# Patient Record
Sex: FEMALE | Race: WHITE | NOT HISPANIC OR LATINO | URBAN - METROPOLITAN AREA
[De-identification: names, ages, dates, MRNs, and addresses within clinical notes are randomized per-mention and may not be internally consistent; named-entity substitution may affect disease eponyms.]

---

## 2023-05-05 DIAGNOSIS — M54.50 ACUTE LOW BACK PAIN, UNSPECIFIED BACK PAIN LATERALITY, UNSPECIFIED WHETHER SCIATICA PRESENT: ICD-10-CM

## 2023-05-05 DIAGNOSIS — M54.50 LOW BACK PAIN, UNSPECIFIED: ICD-10-CM

## 2023-05-08 RX ORDER — TIZANIDINE HYDROCHLORIDE 4 MG/1
CAPSULE, GELATIN COATED ORAL
Qty: 90 CAPSULE | Refills: 1 | Status: SHIPPED | OUTPATIENT
Start: 2023-05-08 | End: 2023-11-28

## 2023-11-07 DIAGNOSIS — I10 ESSENTIAL (PRIMARY) HYPERTENSION: ICD-10-CM

## 2023-11-07 DIAGNOSIS — E78.5 HYPERLIPIDEMIA, UNSPECIFIED: ICD-10-CM

## 2023-11-08 RX ORDER — PRAVASTATIN SODIUM 40 MG/1
TABLET ORAL
Qty: 90 TABLET | Refills: 1 | Status: SHIPPED | OUTPATIENT
Start: 2023-11-08 | End: 2024-05-20

## 2023-11-08 RX ORDER — LOSARTAN POTASSIUM 25 MG/1
TABLET ORAL
Qty: 90 TABLET | Refills: 1 | Status: SHIPPED | OUTPATIENT
Start: 2023-11-08 | End: 2024-05-20

## 2023-11-26 DIAGNOSIS — M54.50 LOW BACK PAIN, UNSPECIFIED: ICD-10-CM

## 2023-11-28 RX ORDER — TIZANIDINE HYDROCHLORIDE 4 MG/1
CAPSULE, GELATIN COATED ORAL
Qty: 90 CAPSULE | Refills: 1 | Status: SHIPPED | OUTPATIENT
Start: 2023-11-28

## 2024-01-12 ENCOUNTER — TELEPHONE (OUTPATIENT)
Dept: PRIMARY CARE | Facility: CLINIC | Age: 64
End: 2024-01-12
Payer: COMMERCIAL

## 2024-01-12 DIAGNOSIS — Z87.09 PERSONAL HISTORY OF OTHER DISEASES OF THE RESPIRATORY SYSTEM: ICD-10-CM

## 2024-01-12 RX ORDER — ALBUTEROL SULFATE 90 UG/1
AEROSOL, METERED RESPIRATORY (INHALATION)
Qty: 18 G | Refills: 2 | Status: SHIPPED | OUTPATIENT
Start: 2024-01-12

## 2024-02-05 ENCOUNTER — OFFICE VISIT (OUTPATIENT)
Dept: PRIMARY CARE | Facility: CLINIC | Age: 64
End: 2024-02-05
Payer: COMMERCIAL

## 2024-02-05 VITALS
HEART RATE: 68 BPM | DIASTOLIC BLOOD PRESSURE: 71 MMHG | OXYGEN SATURATION: 95 % | SYSTOLIC BLOOD PRESSURE: 102 MMHG | BODY MASS INDEX: 20.89 KG/M2 | HEIGHT: 66 IN | WEIGHT: 130 LBS

## 2024-02-05 DIAGNOSIS — I10 PRIMARY HYPERTENSION: ICD-10-CM

## 2024-02-05 DIAGNOSIS — E78.5 HYPERLIPIDEMIA, UNSPECIFIED HYPERLIPIDEMIA TYPE: ICD-10-CM

## 2024-02-05 DIAGNOSIS — A49.9 BACTERIAL INFECTION: Primary | ICD-10-CM

## 2024-02-05 PROCEDURE — 3074F SYST BP LT 130 MM HG: CPT | Performed by: EMERGENCY MEDICINE

## 2024-02-05 PROCEDURE — 99213 OFFICE O/P EST LOW 20 MIN: CPT | Performed by: EMERGENCY MEDICINE

## 2024-02-05 PROCEDURE — 3078F DIAST BP <80 MM HG: CPT | Performed by: EMERGENCY MEDICINE

## 2024-02-05 RX ORDER — OMEPRAZOLE 40 MG/1
40 CAPSULE, DELAYED RELEASE ORAL DAILY
COMMUNITY

## 2024-02-05 RX ORDER — DOXYCYCLINE HYCLATE 100 MG
100 TABLET ORAL 2 TIMES DAILY
Qty: 14 TABLET | Refills: 0 | Status: SHIPPED | OUTPATIENT
Start: 2024-02-05 | End: 2024-02-12

## 2024-02-05 RX ORDER — NEOMYCIN SULFATE, POLYMYXIN B SULFATE, HYDROCORTISONE 3.5; 10000; 1 MG/ML; [USP'U]/ML; MG/ML
2 SOLUTION/ DROPS AURICULAR (OTIC) 4 TIMES DAILY
Qty: 10 ML | Refills: 0 | Status: SHIPPED | OUTPATIENT
Start: 2024-02-05 | End: 2024-02-12

## 2024-02-05 NOTE — PROGRESS NOTES
Subjective   Patient ID: Hanane Prescott is a 63 y.o. female who presents for Earache.    Assessment/Plan   Problem List Items Addressed This Visit       Hyperlipidemia    Hypertension     Other Visit Diagnoses       Bacterial infection    -  Primary    Relevant Medications    doxycycline (Vibra-Tabs) 100 mg tablet    neomycin-polymyxin-HC (Cortisporin) otic solution          Otitis media- doxycycline and antibiotic ear drops ordered     Uterine prolapse- Scheduled for hysterectomy 3/7/24     Hypertension- Continue Cozaar.     Hyperlipidemia- on pravastatin    GERD- prilosec     Preventive care  She had a colonoscopy in 2018-in future she can do Cologuard since there is no family history of colon cancer  Shingles and pneumonia shots up to date   Follows regularly with OB/GYN doctor for Pap smear    Follow up in 3 months for physical or sooner as needed     Source of history: Nurse, Medical personnel, Medical record, Patient.  History limitation: None.    HPI  63 y.o. female here for office visit    Presents with bilateral ear pain and itching onset yesterday. Reports mild rhinitis and sore throat.   Denies cough or fever.     No Known Allergies    Current Outpatient Medications   Medication Sig Dispense Refill    albuterol 90 mcg/actuation inhaler INHALE 1 TO 2 PUFFS BY MOUTH EVERY 4 TO 6 HOURS AS NEEDED 18 g 2    losartan (Cozaar) 25 mg tablet TAKE 1 TABLET BY MOUTH EVERY DAY 90 tablet 1    omeprazole (PriLOSEC) 40 mg DR capsule Take 1 capsule (40 mg) by mouth once daily.      pravastatin (Pravachol) 40 mg tablet TAKE 1 TABLET BY MOUTH EVERY DAY 90 tablet 1    tiZANidine (Zanaflex) 4 mg capsule TAKE 1 CAPSULE BY MOUTH EVERYDAY AT BEDTIME 90 capsule 1    doxycycline (Vibra-Tabs) 100 mg tablet Take 1 tablet (100 mg) by mouth 2 times a day for 7 days. Take with a full glass of water and do not lie down for at least 30 minutes after. 14 tablet 0    neomycin-polymyxin-HC (Cortisporin) otic solution Administer 2 drops  "into each ear 4 times a day for 7 days. 10 mL 0     No current facility-administered medications for this visit.       Objective   Visit Vitals  /71   Pulse 68   Ht 1.676 m (5' 6\")   Wt 59 kg (130 lb)   SpO2 95%   BMI 20.98 kg/m²   Smoking Status Former   BSA 1.66 m²     Physical Exam  Vital signs as per nursing/MA documentation   General appearance: Alert and in no acute distress  HEENT: Moderate swelling of TM bilaterally   Neck: Normal Inspection   Respiratory: No respiratory distress Lungs are clear   Cardiovascular: Heart rate normal. No gallop  Back: Normal Inspection   Skin inspection: Warm   Musculoskeletal: No deformities   Neuro: Limited exam. Baseline    Review of Systems  Comprehensive review of systems as allowed by patient condition and nursing input is negative    No visits with results within 4 Month(s) from this visit.   Latest known visit with results is:   Legacy Encounter on 03/04/2022   Component Date Value Ref Range Status    WBC 03/04/2022 5.8  4.4 - 11.3 x10E9/L Final    nRBC 03/04/2022 0.0  0.0 - 0.0 /100 WBC Final    RBC 03/04/2022 4.62  4.00 - 5.20 x10E12/L Final    Hemoglobin 03/04/2022 13.5  12.0 - 16.0 g/dL Final    Hematocrit 03/04/2022 43.7  36.0 - 46.0 % Final    MCV 03/04/2022 95  80 - 100 fL Final    MCHC 03/04/2022 30.9 (L)  32.0 - 36.0 g/dL Final    Platelets 03/04/2022 237  150 - 450 x10E9/L Final    RDW 03/04/2022 12.1  11.5 - 14.5 % Final    Neutrophils % 03/04/2022 51.4  40.0 - 80.0 % Final    Immature Granulocytes %, Automated 03/04/2022 0.2  0.0 - 0.9 % Final    Lymphocytes % 03/04/2022 35.4  13.0 - 44.0 % Final    Monocytes % 03/04/2022 9.2  2.0 - 10.0 % Final    Eosinophils % 03/04/2022 3.1  0.0 - 6.0 % Final    Basophils % 03/04/2022 0.7  0.0 - 2.0 % Final    Neutrophils Absolute 03/04/2022 3.00  1.20 - 7.70 x10E9/L Final    Lymphocytes Absolute 03/04/2022 2.07  1.20 - 4.80 x10E9/L Final    Monocytes Absolute 03/04/2022 0.54  0.10 - 1.00 x10E9/L Final    " Eosinophils Absolute 03/04/2022 0.18  0.00 - 0.70 x10E9/L Final    Basophils Absolute 03/04/2022 0.04  0.00 - 0.10 x10E9/L Final    Cholesterol 03/04/2022 206 (H)  0 - 199 mg/dL Final    HDL 03/04/2022 59.3  mg/dL Final    Cholesterol/HDL Ratio 03/04/2022 3.5   Final    LDL 03/04/2022 120 (H)  0 - 99 mg/dL Final    VLDL 03/04/2022 26  0 - 40 mg/dL Final    Triglycerides 03/04/2022 132  0 - 149 mg/dL Final    Hemoglobin A1C 03/04/2022 5.9 (A)  % Final    Estimated Average Glucose 03/04/2022 123  MG/DL Final    Glucose 03/04/2022 80  74 - 99 mg/dL Final    Sodium 03/04/2022 142  136 - 145 mmol/L Final    Potassium 03/04/2022 4.0  3.5 - 5.3 mmol/L Final    Chloride 03/04/2022 102  98 - 107 mmol/L Final    Bicarbonate 03/04/2022 29  21 - 32 mmol/L Final    Anion Gap 03/04/2022 15  10 - 20 mmol/L Final    Urea Nitrogen 03/04/2022 14  6 - 23 mg/dL Final    Creatinine 03/04/2022 0.85  0.50 - 1.05 mg/dL Final    GFR Female 03/04/2022 78  >90 mL/min/1.73m2 Final    Calcium 03/04/2022 9.3  8.6 - 10.6 mg/dL Final    Albumin 03/04/2022 4.4  3.4 - 5.0 g/dL Final    Alkaline Phosphatase 03/04/2022 101  33 - 136 U/L Final    Total Protein 03/04/2022 6.6  6.4 - 8.2 g/dL Final    AST 03/04/2022 23  9 - 39 U/L Final    Total Bilirubin 03/04/2022 0.9  0.0 - 1.2 mg/dL Final    ALT (SGPT) 03/04/2022 25  7 - 45 U/L Final    TSH 03/04/2022 3.14  0.44 - 3.98 mIU/L Final       Radiology: Reviewed imaging in powerchart.  No results found.    No family history on file.  Social History     Socioeconomic History    Marital status:      Spouse name: Not on file    Number of children: Not on file    Years of education: Not on file    Highest education level: Not on file   Occupational History    Not on file   Tobacco Use    Smoking status: Former     Types: Cigarettes    Smokeless tobacco: Never   Substance and Sexual Activity    Alcohol use: Not Currently    Drug use: Not on file    Sexual activity: Not on file   Other Topics Concern     Not on file   Social History Narrative    Not on file     Social Determinants of Health     Financial Resource Strain: Not on file   Food Insecurity: Not on file   Transportation Needs: Not on file   Physical Activity: Not on file   Stress: Not on file   Social Connections: Not on file   Intimate Partner Violence: Not on file   Housing Stability: Not on file     Past Medical History:   Diagnosis Date    Essential (primary) hypertension 02/01/2018    Benign essential hypertension    Personal history of other diseases of the musculoskeletal system and connective tissue     Personal history of arthritis    Personal history of other diseases of the respiratory system 11/19/2017    History of chronic obstructive lung disease    Personal history of other specified conditions 08/31/2018    History of abnormal weight loss    Unspecified sensorineural hearing loss 08/31/2018    Hearing loss, central     Past Surgical History:   Procedure Laterality Date    OTHER SURGICAL HISTORY  02/27/2014    Wrist Surgery    OTHER SURGICAL HISTORY  02/27/2014    Ear Surgery       Scribe Attestation  By signing my name below, IAlethea Scribe   attest that this documentation has been prepared under the direction and in the presence of Paul Gomez MD.

## 2024-02-06 ENCOUNTER — TELEPHONE (OUTPATIENT)
Dept: PRIMARY CARE | Facility: CLINIC | Age: 64
End: 2024-02-06
Payer: COMMERCIAL

## 2024-02-06 NOTE — TELEPHONE ENCOUNTER
PT was in yesterday and prescribed doxycycline (Vibra-Tabs) 100 mg tablet.  She said she thinks she is having a reaction because her upper left lip is swollen.  She took a benadryl at 3:30 AM and that didn't help.  The medication also made her feel very nauseated.  She can breathe and nothing hurts, just the swelling of her lip area.

## 2024-05-19 DIAGNOSIS — I10 ESSENTIAL (PRIMARY) HYPERTENSION: ICD-10-CM

## 2024-05-19 DIAGNOSIS — E78.5 HYPERLIPIDEMIA, UNSPECIFIED: ICD-10-CM

## 2024-05-20 RX ORDER — PRAVASTATIN SODIUM 40 MG/1
TABLET ORAL
Qty: 90 TABLET | Refills: 1 | Status: SHIPPED | OUTPATIENT
Start: 2024-05-20

## 2024-05-20 RX ORDER — LOSARTAN POTASSIUM 25 MG/1
TABLET ORAL
Qty: 90 TABLET | Refills: 1 | Status: SHIPPED | OUTPATIENT
Start: 2024-05-20

## 2024-08-30 ENCOUNTER — TELEPHONE (OUTPATIENT)
Dept: PRIMARY CARE | Facility: CLINIC | Age: 64
End: 2024-08-30
Payer: COMMERCIAL

## 2024-08-30 NOTE — TELEPHONE ENCOUNTER
Pt called stating she was called in for jury duty, but she is her dads caregiver so she is requesting a letter excusing her. Father is a gumejosé miguel pt: bryon aguillon 7/[birth year]

## 2024-09-11 ENCOUNTER — TELEMEDICINE (OUTPATIENT)
Dept: PRIMARY CARE | Facility: CLINIC | Age: 64
End: 2024-09-11
Payer: COMMERCIAL

## 2024-09-11 VITALS — WEIGHT: 123 LBS | BODY MASS INDEX: 19.77 KG/M2 | HEIGHT: 66 IN

## 2024-09-11 DIAGNOSIS — A49.9 BACTERIAL INFECTION: Primary | ICD-10-CM

## 2024-09-11 DIAGNOSIS — I10 PRIMARY HYPERTENSION: ICD-10-CM

## 2024-09-11 DIAGNOSIS — R05.9 COUGH, UNSPECIFIED TYPE: ICD-10-CM

## 2024-09-11 DIAGNOSIS — E78.5 HYPERLIPIDEMIA, UNSPECIFIED HYPERLIPIDEMIA TYPE: ICD-10-CM

## 2024-09-11 PROCEDURE — 3008F BODY MASS INDEX DOCD: CPT | Performed by: EMERGENCY MEDICINE

## 2024-09-11 PROCEDURE — 1036F TOBACCO NON-USER: CPT | Performed by: EMERGENCY MEDICINE

## 2024-09-11 PROCEDURE — 99213 OFFICE O/P EST LOW 20 MIN: CPT | Performed by: EMERGENCY MEDICINE

## 2024-09-11 RX ORDER — AZITHROMYCIN 250 MG/1
250 TABLET, FILM COATED ORAL DAILY
Qty: 6 TABLET | Refills: 0 | Status: SHIPPED | OUTPATIENT
Start: 2024-09-11 | End: 2024-09-16

## 2024-09-11 ASSESSMENT — PATIENT HEALTH QUESTIONNAIRE - PHQ9
SUM OF ALL RESPONSES TO PHQ9 QUESTIONS 1 AND 2: 0
2. FEELING DOWN, DEPRESSED OR HOPELESS: NOT AT ALL
1. LITTLE INTEREST OR PLEASURE IN DOING THINGS: NOT AT ALL

## 2024-09-11 ASSESSMENT — ENCOUNTER SYMPTOMS: SORE THROAT: 1

## 2024-09-11 NOTE — PROGRESS NOTES
Subjective   Patient ID: Hanane Prescott is a 64 y.o. female who presents for virtual follow up visit    Assessment/Plan   Problem List Items Addressed This Visit    None    Upper respiratory symptoms - Patient complains of sore throat and ear aches in both ears along with runny nose. We will prescribe Z-pac and continue to monitor.    Uterine prolapse- Had a hysterectomy 3/7/24     Hypertension- Continue Cozaar.     Hyperlipidemia- on pravastatin    GERD- prilosec     Preventive care  She had a colonoscopy in 2018-in future she can do Cologuard since there is no family history of colon cancer  Shingles and pneumonia shots up to date   Follows regularly with OB/GYN doctor for Pap smear    Follow up in 3 months for physical or sooner as needed     Source of history: Nurse, Medical personnel, Medical record, Patient.  History limitation: None.    Sore Throat     Ear Pain       64 y.o. female here for virtual office visit    Reports mild rhinitis and sore throat and bilateral ear infections.    No Known Allergies    Current Outpatient Medications   Medication Sig Dispense Refill    albuterol 90 mcg/actuation inhaler INHALE 1 TO 2 PUFFS BY MOUTH EVERY 4 TO 6 HOURS AS NEEDED 18 g 2    losartan (Cozaar) 25 mg tablet TAKE 1 TABLET BY MOUTH EVERY DAY 90 tablet 1    omeprazole (PriLOSEC) 40 mg DR capsule Take 1 capsule (40 mg) by mouth once daily.      pravastatin (Pravachol) 40 mg tablet TAKE 1 TABLET BY MOUTH EVERY DAY 90 tablet 1    tiZANidine (Zanaflex) 4 mg capsule TAKE 1 CAPSULE BY MOUTH EVERYDAY AT BEDTIME 90 capsule 1     No current facility-administered medications for this visit.       Objective       Physical Exam  Patient was not physically examined as this visit was completed virtually.     Review of Systems  Comprehensive review of systems as allowed by patient condition and nursing input is negative    No visits with results within 4 Month(s) from this visit.   Latest known visit with results is:   Ra  Encounter on 03/04/2022   Component Date Value Ref Range Status    WBC 03/04/2022 5.8  4.4 - 11.3 x10E9/L Final    nRBC 03/04/2022 0.0  0.0 - 0.0 /100 WBC Final    RBC 03/04/2022 4.62  4.00 - 5.20 x10E12/L Final    Hemoglobin 03/04/2022 13.5  12.0 - 16.0 g/dL Final    Hematocrit 03/04/2022 43.7  36.0 - 46.0 % Final    MCV 03/04/2022 95  80 - 100 fL Final    MCHC 03/04/2022 30.9 (L)  32.0 - 36.0 g/dL Final    Platelets 03/04/2022 237  150 - 450 x10E9/L Final    RDW 03/04/2022 12.1  11.5 - 14.5 % Final    Neutrophils % 03/04/2022 51.4  40.0 - 80.0 % Final    Immature Granulocytes %, Automated 03/04/2022 0.2  0.0 - 0.9 % Final    Lymphocytes % 03/04/2022 35.4  13.0 - 44.0 % Final    Monocytes % 03/04/2022 9.2  2.0 - 10.0 % Final    Eosinophils % 03/04/2022 3.1  0.0 - 6.0 % Final    Basophils % 03/04/2022 0.7  0.0 - 2.0 % Final    Neutrophils Absolute 03/04/2022 3.00  1.20 - 7.70 x10E9/L Final    Lymphocytes Absolute 03/04/2022 2.07  1.20 - 4.80 x10E9/L Final    Monocytes Absolute 03/04/2022 0.54  0.10 - 1.00 x10E9/L Final    Eosinophils Absolute 03/04/2022 0.18  0.00 - 0.70 x10E9/L Final    Basophils Absolute 03/04/2022 0.04  0.00 - 0.10 x10E9/L Final    Cholesterol 03/04/2022 206 (H)  0 - 199 mg/dL Final    HDL 03/04/2022 59.3  mg/dL Final    Cholesterol/HDL Ratio 03/04/2022 3.5   Final    LDL 03/04/2022 120 (H)  0 - 99 mg/dL Final    VLDL 03/04/2022 26  0 - 40 mg/dL Final    Triglycerides 03/04/2022 132  0 - 149 mg/dL Final    Hemoglobin A1C 03/04/2022 5.9 (A)  % Final    Estimated Average Glucose 03/04/2022 123  MG/DL Final    Glucose 03/04/2022 80  74 - 99 mg/dL Final    Sodium 03/04/2022 142  136 - 145 mmol/L Final    Potassium 03/04/2022 4.0  3.5 - 5.3 mmol/L Final    Chloride 03/04/2022 102  98 - 107 mmol/L Final    Bicarbonate 03/04/2022 29  21 - 32 mmol/L Final    Anion Gap 03/04/2022 15  10 - 20 mmol/L Final    Urea Nitrogen 03/04/2022 14  6 - 23 mg/dL Final    Creatinine 03/04/2022 0.85  0.50 - 1.05 mg/dL  Final    GFR Female 03/04/2022 78  >90 mL/min/1.73m2 Final    Calcium 03/04/2022 9.3  8.6 - 10.6 mg/dL Final    Albumin 03/04/2022 4.4  3.4 - 5.0 g/dL Final    Alkaline Phosphatase 03/04/2022 101  33 - 136 U/L Final    Total Protein 03/04/2022 6.6  6.4 - 8.2 g/dL Final    AST 03/04/2022 23  9 - 39 U/L Final    Total Bilirubin 03/04/2022 0.9  0.0 - 1.2 mg/dL Final    ALT (SGPT) 03/04/2022 25  7 - 45 U/L Final    TSH 03/04/2022 3.14  0.44 - 3.98 mIU/L Final       Radiology: Reviewed imaging in powerchart.  No results found.    No family history on file.  Social History     Socioeconomic History    Marital status:    Tobacco Use    Smoking status: Former     Types: Cigarettes    Smokeless tobacco: Never   Substance and Sexual Activity    Alcohol use: Not Currently     Past Medical History:   Diagnosis Date    Essential (primary) hypertension 02/01/2018    Benign essential hypertension    Personal history of other diseases of the musculoskeletal system and connective tissue     Personal history of arthritis    Personal history of other diseases of the respiratory system 11/19/2017    History of chronic obstructive lung disease    Personal history of other specified conditions 08/31/2018    History of abnormal weight loss    Unspecified sensorineural hearing loss 08/31/2018    Hearing loss, central     Past Surgical History:   Procedure Laterality Date    OTHER SURGICAL HISTORY  02/27/2014    Wrist Surgery    OTHER SURGICAL HISTORY  02/27/2014    Ear Surgery

## 2024-09-17 DIAGNOSIS — Z87.09 PERSONAL HISTORY OF OTHER DISEASES OF THE RESPIRATORY SYSTEM: ICD-10-CM

## 2024-09-19 RX ORDER — ALBUTEROL SULFATE 90 UG/1
INHALANT RESPIRATORY (INHALATION)
Qty: 18 G | Refills: 2 | Status: SHIPPED | OUTPATIENT
Start: 2024-09-19

## 2024-11-16 DIAGNOSIS — E78.5 HYPERLIPIDEMIA, UNSPECIFIED: ICD-10-CM

## 2024-11-16 DIAGNOSIS — I10 ESSENTIAL (PRIMARY) HYPERTENSION: ICD-10-CM

## 2024-11-19 RX ORDER — PRAVASTATIN SODIUM 40 MG/1
TABLET ORAL
Qty: 90 TABLET | Refills: 1 | Status: SHIPPED | OUTPATIENT
Start: 2024-11-19

## 2024-11-19 RX ORDER — LOSARTAN POTASSIUM 25 MG/1
TABLET ORAL
Qty: 90 TABLET | Refills: 1 | Status: SHIPPED | OUTPATIENT
Start: 2024-11-19

## 2025-04-03 ENCOUNTER — TELEMEDICINE (OUTPATIENT)
Dept: PRIMARY CARE | Facility: CLINIC | Age: 65
End: 2025-04-03
Payer: COMMERCIAL

## 2025-04-03 VITALS — WEIGHT: 124 LBS | HEIGHT: 66 IN | BODY MASS INDEX: 19.93 KG/M2

## 2025-04-03 DIAGNOSIS — E78.2 HYPERLIPEMIA, MIXED: ICD-10-CM

## 2025-04-03 DIAGNOSIS — R05.9 COUGH, UNSPECIFIED TYPE: ICD-10-CM

## 2025-04-03 DIAGNOSIS — J20.8 ACUTE BACTERIAL BRONCHITIS: Primary | ICD-10-CM

## 2025-04-03 DIAGNOSIS — I10 BENIGN ESSENTIAL HYPERTENSION: ICD-10-CM

## 2025-04-03 DIAGNOSIS — B96.89 ACUTE BACTERIAL BRONCHITIS: Primary | ICD-10-CM

## 2025-04-03 PROCEDURE — 1036F TOBACCO NON-USER: CPT | Performed by: INTERNAL MEDICINE

## 2025-04-03 PROCEDURE — 3008F BODY MASS INDEX DOCD: CPT | Performed by: INTERNAL MEDICINE

## 2025-04-03 PROCEDURE — 99213 OFFICE O/P EST LOW 20 MIN: CPT | Performed by: INTERNAL MEDICINE

## 2025-04-03 RX ORDER — AZITHROMYCIN 250 MG/1
TABLET, FILM COATED ORAL
Qty: 6 TABLET | Refills: 0 | Status: SHIPPED | OUTPATIENT
Start: 2025-04-03 | End: 2025-04-08

## 2025-04-03 RX ORDER — METHYLPREDNISOLONE 4 MG/1
TABLET ORAL
Qty: 21 TABLET | Refills: 0 | Status: SHIPPED | OUTPATIENT
Start: 2025-04-03 | End: 2025-04-09

## 2025-04-03 NOTE — PROGRESS NOTES
Subjective   Patient ID: Hanane Prescott is a 64 y.o. female who presents for Virtual Visit (Yellow mucus /Trouble swallowing ), Sore Throat, and Earache (Bilateral ).    Assessment/Plan     Problem List Items Addressed This Visit    None  Visit Diagnoses       Cough, unspecified type    -  Primary    Relevant Medications    azithromycin (Zithromax) 250 mg tablet    methylPREDNISolone (Medrol Dospak) 4 mg tablets    Other Relevant Orders    Comprehensive Metabolic Panel    Lipid Panel    XR chest 2 views    Influenza A, and B PCR    RSV PCR    Sars-CoV-2 PCR    Acute bacterial bronchitis        Relevant Medications    azithromycin (Zithromax) 250 mg tablet    methylPREDNISolone (Medrol Dospak) 4 mg tablets    Other Relevant Orders    Comprehensive Metabolic Panel    Lipid Panel    Influenza A, and B PCR    RSV PCR    Sars-CoV-2 PCR          Patient was evaluated today, problem list was reviewed, problems and concerns addressed, Rx list reviewed and updated, lab and tests were noted and reviewed. Life style changes were discussed, always it works better if we eat plant based diet and plenty of fibres and roughage. Consume adequate amount of water and avoid alcohol, light to moderate physical activities and stress reduction are always beneficial for ongoing physical well being. Do not forget to have 6 to 7 hours of sleep regularly and avoid late night jocelyn screen exposure.    HPIHanane Prescott is a 64 y.o. female who presents for Virtual Visit (Yellow mucus /Trouble swallowing ), Sore Throat, and Earache (Bilateral ).  69-year-old patient have a gastritis hypertension hyperlipidemia and arteries cough congestions ear pain sore throat shortness of breath onset acutely duration 3 days progressive acutely aggravating factor postviral bacterial infections activity working    Negative for hypoxia hypoglycemia fall    Negative for loss of taste or smell    Negative for DVT or PE    Clinical impression acute postviral  bacterial bronchitis given Z-Keith Medrol Dosepak probiotic Shen for the blood test viral panel chest x-ray if not better    Patient will continue albuterol Cozaar Prilosec Pravachol and follow-up with PCP if not better    Assessment  Past Medical History:   Diagnosis Date    Essential (primary) hypertension 02/01/2018    Benign essential hypertension    Personal history of other diseases of the musculoskeletal system and connective tissue     Personal history of arthritis    Personal history of other diseases of the respiratory system 11/19/2017    History of chronic obstructive lung disease    Personal history of other specified conditions 08/31/2018    History of abnormal weight loss    Unspecified sensorineural hearing loss 08/31/2018    Hearing loss, central     Past Surgical History:   Procedure Laterality Date    OTHER SURGICAL HISTORY  02/27/2014    Wrist Surgery    OTHER SURGICAL HISTORY  02/27/2014    Ear Surgery     Allergies   Allergen Reactions    Doxycycline Hyclate Unknown     Current Outpatient Medications   Medication Sig Dispense Refill    albuterol 90 mcg/actuation inhaler INHALE 1 TO 2 PUFFS BY MOUTH EVERY 4 TO 6 HOURS AS NEEDED 18 g 2    azithromycin (Zithromax) 250 mg tablet Take 2 tablets (500 mg) by mouth once daily for 1 day, THEN 1 tablet (250 mg) once daily for 4 days. Take 2 tabs (500 mg) by mouth today, than 1 daily for 4 days.. 6 tablet 0    losartan (Cozaar) 25 mg tablet TAKE 1 TABLET BY MOUTH EVERY DAY 90 tablet 1    methylPREDNISolone (Medrol Dospak) 4 mg tablets Take as directed on package. 21 tablet 0    omeprazole (PriLOSEC) 40 mg DR capsule Take 1 capsule (40 mg) by mouth once daily.      pravastatin (Pravachol) 40 mg tablet TAKE 1 TABLET BY MOUTH EVERY DAY 90 tablet 1    tiZANidine (Zanaflex) 4 mg capsule TAKE 1 CAPSULE BY MOUTH EVERYDAY AT BEDTIME 90 capsule 1     No current facility-administered medications for this visit.     No family history on file.  Social History  "    Socioeconomic History    Marital status:    Tobacco Use    Smoking status: Former     Types: Cigarettes    Smokeless tobacco: Never   Substance and Sexual Activity    Alcohol use: Not Currently     Immunization History   Administered Date(s) Administered    Moderna SARS-CoV-2 Vaccination 03/18/2021, 04/15/2021, 12/03/2021       Review of Systems  Review of systems is otherwise negative unless stated above or in history of present illness.    Objective   Visit Vitals  Ht 1.676 m (5' 6\")   Wt 56.2 kg (124 lb)   BMI 20.01 kg/m²   Smoking Status Former   BSA 1.62 m²     Physical Exam  Patient feels and looks little sick not in distress no skin rash no hypoxia.Doxy.Doxy  This visit was completed via video audio relation to  covid 19 pandemic all issues as below that discuss and address but no physical exam was performed if it was felt that patient should be evaluated in clinic and they have been advised to follow . Patient verbally consented to visit and spent  more than 50% discuss about patient's complaint of problem and plan        No visits with results within 4 Month(s) from this visit.   Latest known visit with results is:   Legacy Encounter on 03/04/2022   Component Date Value Ref Range Status    WBC 03/04/2022 5.8  4.4 - 11.3 x10E9/L Final    nRBC 03/04/2022 0.0  0.0 - 0.0 /100 WBC Final    RBC 03/04/2022 4.62  4.00 - 5.20 x10E12/L Final    Hemoglobin 03/04/2022 13.5  12.0 - 16.0 g/dL Final    Hematocrit 03/04/2022 43.7  36.0 - 46.0 % Final    MCV 03/04/2022 95  80 - 100 fL Final    MCHC 03/04/2022 30.9 (L)  32.0 - 36.0 g/dL Final    Platelets 03/04/2022 237  150 - 450 x10E9/L Final    RDW 03/04/2022 12.1  11.5 - 14.5 % Final    Neutrophils % 03/04/2022 51.4  40.0 - 80.0 % Final    Immature Granulocytes %, Automated 03/04/2022 0.2  0.0 - 0.9 % Final    Lymphocytes % 03/04/2022 35.4  13.0 - 44.0 % Final    Monocytes % 03/04/2022 9.2  2.0 - 10.0 % Final    Eosinophils % 03/04/2022 3.1  0.0 - 6.0 % Final "    Basophils % 03/04/2022 0.7  0.0 - 2.0 % Final    Neutrophils Absolute 03/04/2022 3.00  1.20 - 7.70 x10E9/L Final    Lymphocytes Absolute 03/04/2022 2.07  1.20 - 4.80 x10E9/L Final    Monocytes Absolute 03/04/2022 0.54  0.10 - 1.00 x10E9/L Final    Eosinophils Absolute 03/04/2022 0.18  0.00 - 0.70 x10E9/L Final    Basophils Absolute 03/04/2022 0.04  0.00 - 0.10 x10E9/L Final    Cholesterol 03/04/2022 206 (H)  0 - 199 mg/dL Final    HDL 03/04/2022 59.3  mg/dL Final    Cholesterol/HDL Ratio 03/04/2022 3.5   Final    LDL 03/04/2022 120 (H)  0 - 99 mg/dL Final    VLDL 03/04/2022 26  0 - 40 mg/dL Final    Triglycerides 03/04/2022 132  0 - 149 mg/dL Final    Hemoglobin A1C 03/04/2022 5.9 (A)  % Final    Estimated Average Glucose 03/04/2022 123  MG/DL Final    Glucose 03/04/2022 80  74 - 99 mg/dL Final    Sodium 03/04/2022 142  136 - 145 mmol/L Final    Potassium 03/04/2022 4.0  3.5 - 5.3 mmol/L Final    Chloride 03/04/2022 102  98 - 107 mmol/L Final    Bicarbonate 03/04/2022 29  21 - 32 mmol/L Final    Anion Gap 03/04/2022 15  10 - 20 mmol/L Final    Urea Nitrogen 03/04/2022 14  6 - 23 mg/dL Final    Creatinine 03/04/2022 0.85  0.50 - 1.05 mg/dL Final    GFR Female 03/04/2022 78  >90 mL/min/1.73m2 Final    Calcium 03/04/2022 9.3  8.6 - 10.6 mg/dL Final    Albumin 03/04/2022 4.4  3.4 - 5.0 g/dL Final    Alkaline Phosphatase 03/04/2022 101  33 - 136 U/L Final    Total Protein 03/04/2022 6.6  6.4 - 8.2 g/dL Final    AST 03/04/2022 23  9 - 39 U/L Final    Total Bilirubin 03/04/2022 0.9  0.0 - 1.2 mg/dL Final    ALT (SGPT) 03/04/2022 25  7 - 45 U/L Final    TSH 03/04/2022 3.14  0.44 - 3.98 mIU/L Final       Radiology: Reviewed imaging in powerchart.  Imaging  No results found.    Cardiology, Vascular, and Other Imaging  No other imaging results found for the past 7 days        Charting was completed using voice recognition technology and may include unintended errors.

## 2025-05-17 DIAGNOSIS — E78.5 HYPERLIPIDEMIA, UNSPECIFIED: ICD-10-CM

## 2025-05-17 DIAGNOSIS — I10 ESSENTIAL (PRIMARY) HYPERTENSION: ICD-10-CM

## 2025-05-21 RX ORDER — PRAVASTATIN SODIUM 40 MG/1
40 TABLET ORAL DAILY
Qty: 90 TABLET | Refills: 0 | Status: SHIPPED | OUTPATIENT
Start: 2025-05-21

## 2025-05-21 RX ORDER — LOSARTAN POTASSIUM 25 MG/1
25 TABLET ORAL DAILY
Qty: 90 TABLET | Refills: 0 | Status: SHIPPED | OUTPATIENT
Start: 2025-05-21

## 2025-05-23 ENCOUNTER — OFFICE VISIT (OUTPATIENT)
Dept: PRIMARY CARE | Facility: CLINIC | Age: 65
End: 2025-05-23
Payer: COMMERCIAL

## 2025-05-23 VITALS
OXYGEN SATURATION: 96 % | HEIGHT: 66 IN | WEIGHT: 128.2 LBS | HEART RATE: 57 BPM | SYSTOLIC BLOOD PRESSURE: 120 MMHG | DIASTOLIC BLOOD PRESSURE: 80 MMHG | BODY MASS INDEX: 20.6 KG/M2

## 2025-05-23 DIAGNOSIS — E78.2 HYPERLIPEMIA, MIXED: ICD-10-CM

## 2025-05-23 DIAGNOSIS — J43.9 PULMONARY EMPHYSEMA, UNSPECIFIED EMPHYSEMA TYPE (MULTI): ICD-10-CM

## 2025-05-23 DIAGNOSIS — J44.9 CHRONIC OBSTRUCTIVE PULMONARY DISEASE, UNSPECIFIED COPD TYPE (MULTI): ICD-10-CM

## 2025-05-23 DIAGNOSIS — R05.9 COUGH, UNSPECIFIED TYPE: ICD-10-CM

## 2025-05-23 DIAGNOSIS — I10 BENIGN ESSENTIAL HYPERTENSION: ICD-10-CM

## 2025-05-23 DIAGNOSIS — Z00.00 WELLNESS EXAMINATION: Primary | ICD-10-CM

## 2025-05-23 LAB
NON-UH HIE A/G RATIO: 1.5
NON-UH HIE ALB: 4.2 G/DL (ref 3.4–5)
NON-UH HIE ALK PHOS: 104 UNIT/L (ref 45–117)
NON-UH HIE BASO COUNT: 0.03 X1000 (ref 0–0.2)
NON-UH HIE BASOS %: 0.5 %
NON-UH HIE BILIRUBIN, TOTAL: 1 MG/DL (ref 0.3–1.2)
NON-UH HIE BUN/CREAT RATIO: 13.3
NON-UH HIE BUN: 12 MG/DL (ref 9–23)
NON-UH HIE CALCIUM: 9.6 MG/DL (ref 8.7–10.4)
NON-UH HIE CALCULATED LDL CHOLESTEROL: 109 MG/DL (ref 60–130)
NON-UH HIE CALCULATED OSMOLALITY: 283 MOSM/KG (ref 275–295)
NON-UH HIE CHLORIDE: 103 MMOL/L (ref 98–107)
NON-UH HIE CHOLESTEROL: 195 MG/DL (ref 100–200)
NON-UH HIE CO2, VENOUS: 33 MMOL/L (ref 20–31)
NON-UH HIE CREATININE: 0.9 MG/DL (ref 0.5–0.8)
NON-UH HIE DIFF?: NORMAL
NON-UH HIE EOS COUNT: 0.11 X1000 (ref 0–0.5)
NON-UH HIE EOSIN %: 1.9 %
NON-UH HIE GFR AA: >60
NON-UH HIE GLOBULIN: 2.8 G/DL
NON-UH HIE GLOMERULAR FILTRATION RATE: >60 ML/MIN/1.73M?
NON-UH HIE GLUCOSE: 91 MG/DL (ref 74–106)
NON-UH HIE GOT: 28 UNIT/L (ref 15–37)
NON-UH HIE GPT: 26 UNIT/L (ref 10–49)
NON-UH HIE HCT: 40.7 % (ref 36–46)
NON-UH HIE HDL CHOLESTEROL: 64 MG/DL (ref 40–60)
NON-UH HIE HGB A1C: 5.5 %
NON-UH HIE HGB: 13.6 G/DL (ref 12–16)
NON-UH HIE INSTR WBC: 5.6
NON-UH HIE K: 4 MMOL/L (ref 3.5–5.1)
NON-UH HIE LYMPH %: 35.4 %
NON-UH HIE LYMPH COUNT: 1.99 X1000 (ref 1.2–4.8)
NON-UH HIE MCH: 29.7 PG (ref 27–34)
NON-UH HIE MCHC: 33.4 G/DL (ref 32–37)
NON-UH HIE MCV: 89.2 FL (ref 80–100)
NON-UH HIE MONO %: 8.7 %
NON-UH HIE MONO COUNT: 0.49 X1000 (ref 0.1–1)
NON-UH HIE MPV: 8.9 FL (ref 7.4–10.4)
NON-UH HIE NA: 142 MMOL/L (ref 135–145)
NON-UH HIE NEUTROPHIL %: 53.5 %
NON-UH HIE NEUTROPHIL COUNT (ANC): 3 X1000 (ref 1.4–8.8)
NON-UH HIE NUCLEATED RBC: 0 /100WBC
NON-UH HIE PLATELET: 224 X10 (ref 150–450)
NON-UH HIE RBC: 4.57 X10 (ref 4.2–5.4)
NON-UH HIE RDW: 13 % (ref 11.5–14.5)
NON-UH HIE TOTAL CHOL/HDL CHOL RATIO: 3
NON-UH HIE TOTAL PROTEIN: 7 G/DL (ref 5.7–8.2)
NON-UH HIE TRIGLYCERIDES: 109 MG/DL (ref 30–150)
NON-UH HIE TSH: 1.73 UIU/ML (ref 0.55–4.78)
NON-UH HIE WBC: 5.6 X10 (ref 4.5–11)

## 2025-05-23 PROCEDURE — 99213 OFFICE O/P EST LOW 20 MIN: CPT | Performed by: EMERGENCY MEDICINE

## 2025-05-23 PROCEDURE — 1036F TOBACCO NON-USER: CPT | Performed by: EMERGENCY MEDICINE

## 2025-05-23 PROCEDURE — 99396 PREV VISIT EST AGE 40-64: CPT | Performed by: EMERGENCY MEDICINE

## 2025-05-23 PROCEDURE — 3074F SYST BP LT 130 MM HG: CPT | Performed by: EMERGENCY MEDICINE

## 2025-05-23 PROCEDURE — 3079F DIAST BP 80-89 MM HG: CPT | Performed by: EMERGENCY MEDICINE

## 2025-05-23 PROCEDURE — 3008F BODY MASS INDEX DOCD: CPT | Performed by: EMERGENCY MEDICINE

## 2025-05-23 RX ORDER — ESTRADIOL 0.1 MG/G
1 CREAM VAGINAL
COMMUNITY
Start: 2024-12-06 | End: 2025-12-01

## 2025-05-23 ASSESSMENT — PATIENT HEALTH QUESTIONNAIRE - PHQ9
1. LITTLE INTEREST OR PLEASURE IN DOING THINGS: NOT AT ALL
SUM OF ALL RESPONSES TO PHQ9 QUESTIONS 1 AND 2: 0
2. FEELING DOWN, DEPRESSED OR HOPELESS: NOT AT ALL

## 2025-05-23 NOTE — PROGRESS NOTES
Subjective   Patient ID: Hanane Prescott is a 64 y.o. female who presents for virtual follow up visit    Assessment/Plan   Problem List Items Addressed This Visit    None    Uterine prolapse- Had a hysterectomy 3/7/24     Hypertension- Continue Cozaar.  Blood pressure is well-controlled     Hyperlipidemia- on pravastatin.  Check lipid panel    GERD- prilosec     Preventive care  Gets colonoscopies regularly.  Negative according to her   shingles and pneumonia shots up to date   Follows regularly with OB/GYN doctor for mammogram  S/p hysterectomy      PH Q-9 depression screening was completed by authorized employee of the practice for 5-10 minutes and  explained the questionnaire and discussed the answers with the patient.    Alcohol screening was completed for 5 to 10 minutes    Labs    Follow up in 3 months for physical or sooner as needed     Source of history: Nurse, Medical personnel, Medical record, Patient.  History limitation: None.    64 y.o. female here for office visit    No acute complaints or concerns    Past history-hypertension hyperlipidemia, COPD/asthma    Allergies   Allergen Reactions    Doxycycline Hyclate Unknown       Current Outpatient Medications   Medication Sig Dispense Refill    albuterol 90 mcg/actuation inhaler INHALE 1 TO 2 PUFFS BY MOUTH EVERY 4 TO 6 HOURS AS NEEDED 18 g 2    Estrace 0.01 % (0.1 mg/gram) vaginal cream 0.25 Applicatorfuls (1 g).      losartan (Cozaar) 25 mg tablet TAKE 1 TABLET BY MOUTH EVERY DAY 90 tablet 0    omeprazole (PriLOSEC) 40 mg DR capsule Take 1 capsule (40 mg) by mouth once daily.      pravastatin (Pravachol) 40 mg tablet TAKE 1 TABLET BY MOUTH EVERY DAY 90 tablet 0    tiZANidine (Zanaflex) 4 mg capsule TAKE 1 CAPSULE BY MOUTH EVERYDAY AT BEDTIME 90 capsule 1    vit A/vit C/vit E/zinc/copper (PRESERVISION AREDS ORAL) Take by mouth.       No current facility-administered medications for this visit.       Objective       Physical Exam  Vital signs as per  nursing/MA documentation  General appearance: Alert and in no acute distress  HEENT: Normal Inspection  Neck - Normal Inspection  Respiratory : No respiratory distress. Lungs are clear   Cardiovascular: heart rate normal. No gallop  Back - normal inspection  Skin inspection:Warm  Musculoskeletal : No deformities  Neuro : Limited exam. Baseline    Comprehensive review of systems as allowed by patient condition and nursing input is negative    No visits with results within 4 Month(s) from this visit.   Latest known visit with results is:   Legacy Encounter on 03/04/2022   Component Date Value Ref Range Status    WBC 03/04/2022 5.8  4.4 - 11.3 x10E9/L Final    nRBC 03/04/2022 0.0  0.0 - 0.0 /100 WBC Final    RBC 03/04/2022 4.62  4.00 - 5.20 x10E12/L Final    Hemoglobin 03/04/2022 13.5  12.0 - 16.0 g/dL Final    Hematocrit 03/04/2022 43.7  36.0 - 46.0 % Final    MCV 03/04/2022 95  80 - 100 fL Final    MCHC 03/04/2022 30.9 (L)  32.0 - 36.0 g/dL Final    Platelets 03/04/2022 237  150 - 450 x10E9/L Final    RDW 03/04/2022 12.1  11.5 - 14.5 % Final    Neutrophils % 03/04/2022 51.4  40.0 - 80.0 % Final    Immature Granulocytes %, Automated 03/04/2022 0.2  0.0 - 0.9 % Final    Lymphocytes % 03/04/2022 35.4  13.0 - 44.0 % Final    Monocytes % 03/04/2022 9.2  2.0 - 10.0 % Final    Eosinophils % 03/04/2022 3.1  0.0 - 6.0 % Final    Basophils % 03/04/2022 0.7  0.0 - 2.0 % Final    Neutrophils Absolute 03/04/2022 3.00  1.20 - 7.70 x10E9/L Final    Lymphocytes Absolute 03/04/2022 2.07  1.20 - 4.80 x10E9/L Final    Monocytes Absolute 03/04/2022 0.54  0.10 - 1.00 x10E9/L Final    Eosinophils Absolute 03/04/2022 0.18  0.00 - 0.70 x10E9/L Final    Basophils Absolute 03/04/2022 0.04  0.00 - 0.10 x10E9/L Final    Cholesterol 03/04/2022 206 (H)  0 - 199 mg/dL Final    HDL 03/04/2022 59.3  mg/dL Final    Cholesterol/HDL Ratio 03/04/2022 3.5   Final    LDL 03/04/2022 120 (H)  0 - 99 mg/dL Final    VLDL 03/04/2022 26  0 - 40 mg/dL Final     Triglycerides 2022 132  0 - 149 mg/dL Final    Hemoglobin A1C 2022 5.9 (A)  % Final    Estimated Average Glucose 2022 123  MG/DL Final    Glucose 2022 80  74 - 99 mg/dL Final    Sodium 2022 142  136 - 145 mmol/L Final    Potassium 2022 4.0  3.5 - 5.3 mmol/L Final    Chloride 2022 102  98 - 107 mmol/L Final    Bicarbonate 2022 29  21 - 32 mmol/L Final    Anion Gap 2022 15  10 - 20 mmol/L Final    Urea Nitrogen 2022 14  6 - 23 mg/dL Final    Creatinine 2022 0.85  0.50 - 1.05 mg/dL Final    GFR Female 2022 78  >90 mL/min/1.73m2 Final    Calcium 2022 9.3  8.6 - 10.6 mg/dL Final    Albumin 2022 4.4  3.4 - 5.0 g/dL Final    Alkaline Phosphatase 2022 101  33 - 136 U/L Final    Total Protein 2022 6.6  6.4 - 8.2 g/dL Final    AST 2022 23  9 - 39 U/L Final    Total Bilirubin 2022 0.9  0.0 - 1.2 mg/dL Final    ALT (SGPT) 2022 25  7 - 45 U/L Final    TSH 2022 3.14  0.44 - 3.98 mIU/L Final       Radiology: Reviewed imaging in powerchart.  No results found.    No family history on file.  Social History     Socioeconomic History    Marital status:    Tobacco Use    Smoking status: Former     Current packs/day: 0.00     Types: Cigarettes     Quit date: 3/13/2021     Years since quittin.1    Smokeless tobacco: Never   Vaping Use    Vaping status: Never Used   Substance and Sexual Activity    Alcohol use: Not Currently     Past Medical History:   Diagnosis Date    Essential (primary) hypertension 2018    Benign essential hypertension    Personal history of other diseases of the musculoskeletal system and connective tissue     Personal history of arthritis    Personal history of other diseases of the respiratory system 2017    History of chronic obstructive lung disease    Personal history of other specified conditions 2018    History of abnormal weight loss    Unspecified sensorineural  hearing loss 08/31/2018    Hearing loss, central     Past Surgical History:   Procedure Laterality Date    OTHER SURGICAL HISTORY  02/27/2014    Wrist Surgery    OTHER SURGICAL HISTORY  02/27/2014    Ear Surgery

## 2025-08-17 DIAGNOSIS — E78.5 HYPERLIPIDEMIA, UNSPECIFIED: ICD-10-CM

## 2025-08-17 DIAGNOSIS — I10 ESSENTIAL (PRIMARY) HYPERTENSION: ICD-10-CM

## 2025-08-18 RX ORDER — LOSARTAN POTASSIUM 25 MG/1
25 TABLET ORAL DAILY
Qty: 90 TABLET | Refills: 1 | Status: SHIPPED | OUTPATIENT
Start: 2025-08-18

## 2025-08-18 RX ORDER — PRAVASTATIN SODIUM 40 MG/1
40 TABLET ORAL DAILY
Qty: 90 TABLET | Refills: 1 | Status: SHIPPED | OUTPATIENT
Start: 2025-08-18